# Patient Record
Sex: FEMALE | Race: ASIAN | NOT HISPANIC OR LATINO
[De-identification: names, ages, dates, MRNs, and addresses within clinical notes are randomized per-mention and may not be internally consistent; named-entity substitution may affect disease eponyms.]

---
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
## 2022-11-11 NOTE — REASON FOR VISIT
11-Nov-2022 20:18 [Consultation] : a consultation visit [Family Member] : family member [Other: _____] : [unfilled]